# Patient Record
(demographics unavailable — no encounter records)

---

## 2024-12-12 NOTE — PHYSICAL EXAM
[Tired appearing] : not tired appearing [Lethargic] : not lethargic [Toxic] : not toxic [Clear] : right tympanic membrane not clear [Erythema] : erythema [Bulging] : bulging [Purulent Effusion] : purulent effusion [NL] : warm, clear

## 2024-12-12 NOTE — HISTORY OF PRESENT ILLNESS
[de-identified] : ha 2nd ear infection in 2 weeks, is on amoxicillin and still getting fevers. Left school this AM from school nurse with 100.1 fever. Still coughing.  [FreeTextEntry6] : 8 year girl here for follow up of AOM from urgent care prescribed amoxicillin again but not better  fevers even on 3 days amox abx TMAX 103F on Monday and Tuesday, fever this morning low grade ~100F at school and sent home Step negative at   was on amoxicillin 2 weeks ago for AOM  NO vomiting, no diarrhea. slight cough and congestion and ear pain both ears  She has no difficulty with sleep. Eating and drinking well. Parents have no concerns at this time.

## 2024-12-12 NOTE — PLAN
[TextEntry] : Changed to Augmentin, was on amox. twice in 1 month and still symptomatic   Symptomatic treatment. Maintain adequate hydration & rest. Warm Mist humidifier in room.  Nasal suctioning PRN if applicable Any fever >5 days, return to office. Stressed hand washing and infection control Pay close observation for new or worsening symptoms  Control pain and fever with OTC Acetaminophen and Motrin (only if older than 6 months) Take Antibiotics as prescribed, continue taking even if child is feeling better/fever free, take entirety of course.  Return to office if symptoms persist of worsens. Return to office in 2 weeks for ear recheck exam.

## 2024-12-12 NOTE — REVIEW OF SYSTEMS
[Ear Pain] : ear pain [Nasal Congestion] : nasal congestion [Sore Throat] : no sore throat [Tachypnea] : not tachypneic [Wheezing] : no wheezing [Cough] : cough [Congestion] : congestion [Shortness of Breath] : no shortness of breath [Negative] : Genitourinary

## 2025-07-23 NOTE — PLAN
[TextEntry] : Continue balanced diet with all food groups. Brush teeth twice a day with toothbrush. Recommend visit to dentist. Help child to maintain consistent daily routines and sleep schedule. School discussed. Ensure home is safe. Teach child about personal safety. Use consistent, positive discipline. Limit screen time to no more than 2 hours per day. Encourage physical activity. Child needs to ride in a belt-positioning booster seat until  4 feet 9 inches has been reached and are between 8 and 12 years of age.  Can participate in all age related sports without restriction.  Return 1 year for routine well child check, sooner if concerns. 5-2-1-0 form reviewed, care coordination reviewed  con't with psych and therapist work up for day time enuresis if urine cx pos will need to start cephalexin BID 7 days if sensitive  hygiene problems, work with therapist

## 2025-07-23 NOTE — PHYSICAL EXAM
[TextEntry] : General: awake, alert, no acute distress, interactive, cooperative, appropriate for age Head: normocephalic, no signs injury Eyes: + red reflex bilaterally, EOMI, no purulent discharge, no conjunctival or scleral erythema Ears: tympanic membranes normal appearing bilaterally, no ear pit or tag Nose: no discharge Mouth: mucosa moist and pink, oropharynx without erythema Neck: supple, FROM Lungs: clear to auscultation bilaterally, no accessory muscle use Cardiac: normal S1 S2, regular rate and rhythm, no murmur appreciated Abdomen: soft, non tender, non distended, no hepatosplenomegaly  Chest: pietro 1 breast development Genitals: pietro 1 normal appearing female genitalia Lymphatics: no abnormal lymph nodes palpated Back: no scoliosis noted Skin: no rash, no lesions

## 2025-07-23 NOTE — HISTORY OF PRESENT ILLNESS
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 level 1 and adhd inattentive type, MYKE and tic disorder, refuse IEP because she does well, but they give her a 504 plan, gets counseling in school in therapy and also followed by Dr Adames pending genesite testing shes on prozac 40mg just started ritalin and mom feels its hard to tell to see if its helping and will work way up  doing well in school, likes teacher, has friends, no bullying no problems in school identified, no ADHD concerns participates in forced activities does girl scouts, chorus in school,, horseback riding camp  no history of injury  and  patient is doing well - has no concerns or issues. appetite good, eats variety of foods, 3 meals a day and snacks, consumes fruits, vegetables, meat, dairy no sleep concerns, goes to dentist regularly, brushing teeth 1-2 x a day (tries 2 x a day) patient not having any fevers without a cause, pain that wakes them in the night, or night sweats. Urinating and stooling normally , no chest pain, palpitations or syncope with exercise. moms main concern with ADL skills very poor hygiene, they now have to diaper her for urination  will pretend to take a shower, she wont brush her hair or teeth but mom can't get her to care and she doesnt know why diapering her at night it made it worse  neurology cleared for seizure activity